# Patient Record
Sex: FEMALE | Race: WHITE | URBAN - METROPOLITAN AREA
[De-identification: names, ages, dates, MRNs, and addresses within clinical notes are randomized per-mention and may not be internally consistent; named-entity substitution may affect disease eponyms.]

---

## 2018-01-12 NOTE — PROGRESS NOTES
Assessment    1  Chronic vaginitis (616 10) (N76 1)    Plan  Chronic vaginitis    · Terconazole 0 8 % Vaginal Cream (Terazol 3); INSERT 1 APPLICATORFUL  INTRAVAGINALLY AT BEDTIME   Rx By: Aury Wiley; Dispense: 0 Days ; #:1 GM; Refill: 1; For: Chronic vaginitis; MAGNOLIA = N; Verified Transmission to [id number]; Last Updated By: System, SureScripts; 2/2/2016 2:40:46 PM   · (1) VAGINITIS/ VAGINOSIS, DNA ( AFFIRM); Status:Active; Requested for:[id number];    Perform:St  [address] [place] [address]; GEQ:[id number]; For:Chronic vaginitis; Ordered By:Abdullahi Nelson;  Irregular menstrual cycle    · Norethindrone Acet-Ethinyl Est 1-20 MG-MCG Oral Tablet (Loestrin 1/20 (21)); TAKE  1 TABLET DAILY   Rx By: Aury Wiley; Dispense: 28 Days ; #:28 Tablet; Refill: 3; For: Irregular menstrual cycle; MAGNOLIA = N; Verified Transmission to [id number] Last Updated By: System, SureScripts; 2/2/2016 2:40:46 PM    Discussion/Summary  Discussion Summary:   R/w pt acidophilus to decrease recurrence of vaginits sx  Advised changing razor frequenty and occasionally using antibacterial soap to decrease recurrence of folliculitis  Advised OCP start up usage, back up, etc  Plan pill check 3 mths  GYN Discussion and Summary:    Candidiasis--  Chief Complaint  Chief Complaint Free Text Note Form: Pt for vaginal complaints  History of Present Illness  HPI: Pt with complaints of recurrent vaginitis x 4 mths  Has used antibacterial soap w no resolution, no OTC tx  Complains of vaginal d/c, no itching, no odor, no irritation just thick d/c, seems best week after period  Has done always clean wipes without relief, also w swelling after intercourse a few times, does use lube, but nothing new  Does also c/o pain after intercourse  With partner x 13 mths, no new partners on either side     Pt also w complaints of pimple like bumps vaginally, no itching, no changes to them, just there, come and go  Pt also desires OCPs restart, did one previously but had nausea so she d/c'd  Review of Systems   Female ROS: vaginal discharge, but no pelvic pain, no pelvic pressure, no vaginal pain, no vaginal itching, no vaginal lump or mass, no vaginal odor, no dysmenorrhea, denied amenorrhea, no menorrhagia, no dysuria, no bladder pain, no change in urinary frequency, no feelings of urinary urgency and no urinary hesitancy  Focused-Female:   Constitutional: no fever and no chills  Cardiovascular: no chest pain  Respiratory: no shortness of breath and no wheezing  Breasts: no breast pain and no breast lump  Integumentary: no rashes  Active Problems    1  Irregular menstrual cycle (626 4) (N92 6)    Surgical History    1  History of Leg Osteotomy Tibial    Family History    1  Family history of malignant neoplasm of female breast (V16 3) (Z80 3)    Social History    · Monogamous relationship   · Non-smoker (V49 89) (Z78 9)   · Primary spoken language English   · Uses condoms    Vitals  Vital Signs [Data Includes: Current Encounter]    Recorded: 63BTP2492 88:72AF   Systolic 472, LUE, Sitting   Diastolic 70, LUE, Sitting   Height 5 ft 2 in   2-20 Stature Percentile 19 %   Weight 123 lb    2-20 Weight Percentile 40 %   BMI Calculated 22 5   BMI Percentile 59 %   BSA Calculated 1 55     Physical Exam    Constitutional   General appearance: No acute distress, well appearing and well nourished  Genitourinary   External genitalia: Abnormal   multiple areas of folliculitis present, no vesicles or inguinal adenopathy  Vagina: Abnormal   moderate amount of vaginal erythema w thick chunky d/c noted c/w candida  Urethra: Normal     Urethral meatus: Normal     Bladder: Normal, soft, non-tender and no prolapse or masses appreciated  Cervix: Normal, no palpable masses  Uterus: Normal, non-tender, not enlarged, and no palpable masses      Adnexa/parametria: Normal, non-tender and no fullness or masses appreciated         Future Appointments    Date/Time Provider Specialty Site   05/02/2016 01:00 PM Judson Cheung, [address] [facility]     Signatures   Electronically signed by : Jesenia Durham [facility]; Feb 2 2016  2:40PM EST                       (Author)    Electronically signed by : Jesenia Durham, [facility]; Feb 2 2016  2:41PM EST                       (Author)    Electronically signed by : VERA Henry ; Feb  3 2016  1:54AM EST

## 2019-08-30 ENCOUNTER — OFFICE VISIT (OUTPATIENT)
Dept: OBGYN CLINIC | Facility: CLINIC | Age: 24
End: 2019-08-30
Payer: COMMERCIAL

## 2019-08-30 VITALS
BODY MASS INDEX: 25.86 KG/M2 | HEIGHT: 61 IN | DIASTOLIC BLOOD PRESSURE: 72 MMHG | SYSTOLIC BLOOD PRESSURE: 110 MMHG | WEIGHT: 137 LBS

## 2019-08-30 DIAGNOSIS — B37.9 YEAST INFECTION: ICD-10-CM

## 2019-08-30 DIAGNOSIS — Z01.419 ROUTINE GYNECOLOGICAL EXAMINATION: Primary | ICD-10-CM

## 2019-08-30 DIAGNOSIS — Z11.3 SCREENING EXAMINATION FOR VENEREAL DISEASE: ICD-10-CM

## 2019-08-30 PROCEDURE — S0612 ANNUAL GYNECOLOGICAL EXAMINA: HCPCS | Performed by: PHYSICIAN ASSISTANT

## 2019-08-30 NOTE — PROGRESS NOTES
Assessment/Plan   Problem List Items Addressed This Visit        Other    Routine gynecological examination - Primary     Pap guidelines reviewed  Pap with reflex and STD cultures done today  Return to office for annual or as needed  Relevant Orders    GP PAP/CT/GC (Reflex HPV PLUS when ASC-US)    Yeast infection     Reviewed yeast infection with patient  Will plan to treat with Terconazole x 3 nights  For prevention: Recommend acidophilus or yogurt daily, avoid irritating soaps or lotions, no tight fitted pants or underwear, avoid bubble baths, do not stay in wet swimsuit  Reviewed to avoid irritating lubrications, recommend coconut oil for lubrication  Aware will break down a condoms  Return to office if symptoms return, worsen, or do not resolve  Relevant Medications    terconazole (TERAZOL 3) 0 8 % vaginal cream    Other Relevant Orders    HEMOGLOBIN A1C W/ EAG ESTIMATION    Comprehensive metabolic panel      Other Visit Diagnoses     Screening examination for venereal disease        Relevant Orders    Hepatitis B surface antigen    Hepatitis C antibody    HIV 1/2 AG-AB combo    RPR    GP PAP/CT/GC (Reflex HPV PLUS when ASC-US)          Subjective:     Patient ID: Gal Mcdonald is a 22 y o  y o  female  HPI  23 yo seen for annual exam  Reports menses monthly every 34 days or so, not too heavy or painful  Not using anything for birth control, ok if conception occurs  Complains of recurrent yeast infections of the last few months  Of note had wisdom teeth removed mid August and was on Amoxillicin and steriods  Reports was already have mild symptoms of yeast that worsened after medications  Had tried monistat in the past which cleared previously but returned soon after  Describes current symptoms of thick white discharge, swells and painful with intercourse  Reports oral intercourse makes it worse  Sometimes worsens with certain lubrications   No STD concerns, but agrees to testing today  Denies bowel or bladder issues  Last pap: N/A  The following portions of the patient's history were reviewed and updated as appropriate:   She  has a past medical history of Anxiety, Depression, and GERD (gastroesophageal reflux disease)  She   Patient Active Problem List    Diagnosis Date Noted    Routine gynecological examination 2019    Yeast infection 2019    Chronic vaginitis 2016    Irregular menstrual cycle 2016     She  has a past surgical history that includes Tibia fracture surgery (2012); CHOLECYSTECTOMY LAPAROSCOPIC (2017); and [place] tooth extraction (2019)  Her family history includes Breast cancer in her maternal aunt; Hypertension in her mother  She  reports that she has never smoked  She has never used smokeless tobacco  She reports that she drinks alcohol  She reports that she has current or past drug history  Drug: Marijuana  Current Outpatient Medications   Medication Sig Dispense Refill    terconazole (TERAZOL 3) 0 8 % vaginal cream Insert 1 applicator into the vagina daily at bedtime 20 g 0     No current facility-administered medications for this visit  She has No Known Allergies       Menstrual History:  OB History        0    Para   0    Term   0       0    AB   0    Living   0       SAB   0    TAB   0    Ectopic   0    Multiple   0    Live Births   0                Menarche age: 16  Patient's last menstrual period was 2019 (approximate)  Period Cycle (Days): 34  Period Duration (Days): 7  Period Pattern: Regular  Menstrual Flow: Moderate  Dysmenorrhea: None      Review of Systems   Constitutional: Negative for fatigue, fever and unexpected weight change  HENT: Negative for dental problem and sinus pressure  Eyes: Negative for visual disturbance  Respiratory: Negative for cough, shortness of breath and wheezing  Cardiovascular: Negative for chest pain     Gastrointestinal: Negative for abdominal pain, blood in stool, constipation, diarrhea, nausea and vomiting  Endocrine: Negative for polydipsia  Genitourinary: Positive for vaginal discharge  Negative for difficulty urinating, dyspareunia, dysuria, frequency, hematuria, pelvic pain and urgency  Musculoskeletal: Negative for arthralgias and back pain  Neurological: Negative for dizziness, seizures, light-headedness and headaches  Psychiatric/Behavioral: Negative for suicidal ideas  The patient is not nervous/anxious  Objective:  Vitals:    08/30/19 0803   BP: 110/72   BP Location: Left arm   Patient Position: Sitting   Cuff Size: Standard   Weight: 62 1 kg (137 lb)   Height: 5' 1" (1 549 m)      Physical Exam   Constitutional: She is oriented to person, place, and time  She appears well-developed and well-nourished  Genitourinary: Uterus normal  There is no rash, tenderness, lesion, injury or Bartholin's cyst on the right labia  There is no rash, tenderness, lesion, injury or Bartholin's cyst on the left labia  Vagina exhibits no lesion  There is erythema in the vagina  No tenderness or bleeding in the vagina  No signs of injury around the vagina  Vaginal discharge (copious amounts of thick white discharge in vaginal vault) found  Right adnexum does not display mass, does not display tenderness and does not display fullness  Left adnexum does not display mass, does not display tenderness and does not display fullness  Cervix does not exhibit motion tenderness, lesion or discharge  Uterus is not enlarged, tender, exhibiting a mass, irregular (is regular) or mobile  HENT:   Head: Normocephalic and atraumatic  Neck: No thyromegaly present  Cardiovascular: Normal rate, regular rhythm and normal heart sounds  Exam reveals no gallop and no friction rub  No murmur heard  Pulmonary/Chest: Effort normal and breath sounds normal  No respiratory distress  She has no wheezes   Right breast exhibits no inverted nipple, no mass, no nipple discharge, no skin change and no tenderness  Left breast exhibits no inverted nipple, no mass, no nipple discharge, no skin change and no tenderness  No breast swelling  Breasts are symmetrical    Abdominal: Soft  She exhibits no distension and no mass  There is no tenderness  There is no rebound and no guarding  No hernia  Lymphadenopathy:     She has no cervical adenopathy  Right: No inguinal adenopathy present  Left: No inguinal adenopathy present  Neurological: She is alert and oriented to person, place, and time  Skin: Skin is warm and dry  Psychiatric: She has a normal mood and affect   Her behavior is normal

## 2019-09-01 PROBLEM — Z01.419 ROUTINE GYNECOLOGICAL EXAMINATION: Status: ACTIVE | Noted: 2019-09-01

## 2019-09-01 PROBLEM — B37.9 YEAST INFECTION: Status: ACTIVE | Noted: 2019-09-01

## 2019-09-01 NOTE — ASSESSMENT & PLAN NOTE
Reviewed yeast infection with patient  Will plan to treat with Terconazole x 3 nights  For prevention: Recommend acidophilus or yogurt daily, avoid irritating soaps or lotions, no tight fitted pants or underwear, avoid bubble baths, do not stay in wet swimsuit  Reviewed to avoid irritating lubrications, recommend coconut oil for lubrication  Aware will break down a condoms  Return to office if symptoms return, worsen, or do not resolve

## 2019-09-01 NOTE — ASSESSMENT & PLAN NOTE
Pap guidelines reviewed  Pap with reflex and STD cultures done today  Return to office for annual or as needed

## 2019-09-03 LAB
ALBUMIN SERPL-MCNC: 4.4 G/DL (ref 3.6–5.1)
ALBUMIN/GLOB SERPL: 1.5 (CALC) (ref 1–2.5)
ALP SERPL-CCNC: 79 U/L (ref 33–115)
ALT SERPL-CCNC: 9 U/L (ref 6–29)
AST SERPL-CCNC: 11 U/L (ref 10–30)
BILIRUB SERPL-MCNC: 0.4 MG/DL (ref 0.2–1.2)
BUN SERPL-MCNC: 17 MG/DL (ref 7–25)
BUN/CREAT SERPL: NORMAL (CALC) (ref 6–22)
CALCIUM SERPL-MCNC: 9.8 MG/DL (ref 8.6–10.2)
CHLORIDE SERPL-SCNC: 105 MMOL/L (ref 98–110)
CO2 SERPL-SCNC: 28 MMOL/L (ref 20–32)
CREAT SERPL-MCNC: 0.7 MG/DL (ref 0.5–1.1)
EST. AVERAGE GLUCOSE BLD GHB EST-MCNC: 100 (CALC)
EST. AVERAGE GLUCOSE BLD GHB EST-SCNC: 5.5 (CALC)
GLOBULIN SER CALC-MCNC: 2.9 G/DL (CALC) (ref 1.9–3.7)
GLUCOSE SERPL-MCNC: 87 MG/DL (ref 65–99)
HBA1C MFR BLD: 5.1 % OF TOTAL HGB
HBV SURFACE AG SERPL QL IA: NORMAL
HCV AB S/CO SERPL IA: 0.02
HCV AB SERPL QL IA: NORMAL
HIV 1+2 AB+HIV1 P24 AG SERPL QL IA: NORMAL
POTASSIUM SERPL-SCNC: 4.7 MMOL/L (ref 3.5–5.3)
PROT SERPL-MCNC: 7.3 G/DL (ref 6.1–8.1)
RPR SER QL: NORMAL
SL AMB EGFR AFRICAN AMERICAN: 142 ML/MIN/1.73M2
SL AMB EGFR NON AFRICAN AMERICAN: 122 ML/MIN/1.73M2
SODIUM SERPL-SCNC: 139 MMOL/L (ref 135–146)

## 2019-09-06 LAB
DEPRECATED C TRACH RRNA XXX QL PRB: NOT DETECTED
N GONORRHOEA DNA UR QL NAA+PROBE: NOT DETECTED
THIN PREP CVX: NORMAL

## 2019-09-30 ENCOUNTER — TELEPHONE (OUTPATIENT)
Dept: OBGYN CLINIC | Facility: CLINIC | Age: 24
End: 2019-09-30

## 2019-09-30 NOTE — TELEPHONE ENCOUNTER
Looking to see about getting 6 month antifungal rx due to recurring infections  [facility] pharmacy on [place]

## 2019-10-02 DIAGNOSIS — B96.89 BACTERIAL VAGINOSIS: Primary | ICD-10-CM

## 2019-10-02 DIAGNOSIS — N76.0 BACTERIAL VAGINOSIS: Primary | ICD-10-CM

## 2019-10-02 RX ORDER — FLUCONAZOLE 100 MG/1
TABLET ORAL
Qty: 17 TABLET | Refills: 0 | Status: SHIPPED | OUTPATIENT
Start: 2019-10-02 | End: 2020-04-02

## 2019-10-31 ENCOUNTER — TELEPHONE (OUTPATIENT)
Dept: OBGYN CLINIC | Facility: CLINIC | Age: 24
End: 2019-10-31

## 2019-10-31 NOTE — TELEPHONE ENCOUNTER
Pt called for a refill for Diflucan for six months  After investigating her prescription was for "1 tab daily for 7 day, weekly for 4 wk, monthly for 6 month"  Pt took the 17 pills for 17 days straight  What would you recommend for pt going forward? Pt currently states she has no symptoms   Pt would like a call back with what you advise - forward to clinical for phone call  :)